# Patient Record
Sex: FEMALE | Race: WHITE | NOT HISPANIC OR LATINO | Employment: FULL TIME | URBAN - METROPOLITAN AREA
[De-identification: names, ages, dates, MRNs, and addresses within clinical notes are randomized per-mention and may not be internally consistent; named-entity substitution may affect disease eponyms.]

---

## 2020-01-23 ENCOUNTER — OFFICE VISIT (OUTPATIENT)
Dept: INTERNAL MEDICINE | Facility: CLINIC | Age: 25
End: 2020-01-23

## 2020-01-23 VITALS
DIASTOLIC BLOOD PRESSURE: 72 MMHG | HEIGHT: 66 IN | OXYGEN SATURATION: 97 % | WEIGHT: 145.3 LBS | SYSTOLIC BLOOD PRESSURE: 116 MMHG | TEMPERATURE: 97.3 F | BODY MASS INDEX: 23.35 KG/M2 | HEART RATE: 86 BPM

## 2020-01-23 DIAGNOSIS — Z20.828 EXPOSURE TO INFLUENZA: Primary | ICD-10-CM

## 2020-01-23 DIAGNOSIS — R68.89 FLU-LIKE SYMPTOMS: ICD-10-CM

## 2020-01-23 DIAGNOSIS — E04.1 THYROID NODULE: ICD-10-CM

## 2020-01-23 PROBLEM — M62.81 MUSCLE WEAKNESS: Status: RESOLVED | Noted: 2019-12-13 | Resolved: 2020-01-23

## 2020-01-23 PROBLEM — M54.50 ACUTE MIDLINE LOW BACK PAIN WITHOUT SCIATICA: Status: RESOLVED | Noted: 2019-12-13 | Resolved: 2020-01-23

## 2020-01-23 PROBLEM — M54.50 ACUTE MIDLINE LOW BACK PAIN WITHOUT SCIATICA: Status: ACTIVE | Noted: 2019-12-13

## 2020-01-23 PROBLEM — M62.81 MUSCLE WEAKNESS: Status: ACTIVE | Noted: 2019-12-13

## 2020-01-23 LAB
EXPIRATION DATE: NORMAL
FLUAV AG NPH QL: NEGATIVE
FLUBV AG NPH QL: NEGATIVE
INTERNAL CONTROL: NORMAL
Lab: NORMAL

## 2020-01-23 PROCEDURE — 87804 INFLUENZA ASSAY W/OPTIC: CPT | Performed by: FAMILY MEDICINE

## 2020-01-23 PROCEDURE — 99204 OFFICE O/P NEW MOD 45 MIN: CPT | Performed by: FAMILY MEDICINE

## 2020-01-23 RX ORDER — OSELTAMIVIR PHOSPHATE 75 MG/1
75 CAPSULE ORAL 2 TIMES DAILY
Qty: 10 CAPSULE | Refills: 0 | Status: SHIPPED | OUTPATIENT
Start: 2020-01-23 | End: 2020-01-28

## 2020-01-23 NOTE — PROGRESS NOTES
Date of Encounter: 2020  Patient: Pari HARVEY,  1995    Subjective   History of Presenting Illness  Chief complaint: Influenza exposure    Patient presents with fever, myalgias, cough after a known influenza A exposure.  Patient was in close contact with a friend for 4 days on a trip and since then that friend has been confirmed influenza A.  Patient has already received influenza vaccine this season.    Recent  after uncomplicated pregnancy.  3-month-old at home.  Breast-feeding.  Feels mood is doing well.    History of thyroid nodule: Thyroid nodule in left side has increased in size since last evaluation approximately 7 years ago.  7 years ago she had an ultrasound, labs, and biopsy which were benign per her report.    Lives with spouse and child.  Sexually active not on contraception.  Never smoker.  No alcohol use.  Exercises twice per week.  Works in [employer].  Up-to-date on hepatitis B, influenza, Td.  Last Pap 2019.    Review of Systems:  Negative for chest pain upon exertion, shortness of breath, vision changes, vomiting, dysuria, lymphadenopathy, muscle weakness, numbness, mood changes, rashes.    Positive for fever, cough, congestion    The following portions of the patient's history were reviewed and updated as appropriate: allergies, current medications, past family history, past medical history, past social history, past surgical history and problem list.    Patient Active Problem List   Diagnosis   • Acute midline low back pain without sciatica   •  (normal spontaneous vaginal delivery)   • Muscle weakness     History reviewed. No pertinent past medical history.  No past surgical history on file.  No family history on file.    Current Outpatient Medications:   •  oseltamivir (TAMIFLU) 75 MG capsule, Take 1 capsule by mouth 2 (Two) Times a Day for 5 days., Disp: 10 capsule, Rfl: 0  •  PRENATAL VIT-FE FUMARATE-FA PO, Take  by mouth Daily., Disp: , Rfl:   No Known  "Allergies  Social History     Tobacco Use   • Smoking status: Never Smoker   • Smokeless tobacco: Never Used   Substance Use Topics   • Alcohol use: Yes     Comment: SC   • Drug use: Not on file          Objective   Physical Exam  Vitals:    01/23/20 1055   BP: 116/72   Pulse: 86   Temp: 97.3 °F (36.3 °C)   SpO2: 97%   Weight: 65.9 kg (145 lb 4.8 oz)   Height: 167.6 cm (66\")     Body mass index is 23.45 kg/m².    Constitutional: NAD.  Eyes: EOMI. PERRLA. Normal conjunctiva.  Ear, nose, mouth, throat: No tonsillar exudates or erythema.   Normal nasal mucosa. Normal external ear canals and TMs bilaterally.  Cardiovascular: RRR. No murmurs. No LE edema b/l. Radial pulses 2+ bilaterally.  Pulmonary: CTA b/l. Good effort.  Integumentary: No lacerations or wounds on face or upper extremities.  Lymphatic: No anterior cervical lymphadenopathy.  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  No associated lymphadenopathy.  Psychiatric: Normal affect. Normal thought content.     Assessment/Plan   Assessment and Plan  Pleasant 24-year-old female with thyroid nodule, recent influenza A exposure    Diagnoses and all orders for this visit:    1. Exposure to influenza (Primary)  -     oseltamivir (TAMIFLU) 75 MG capsule; Take 1 capsule by mouth 2 (Two) Times a Day for 5 days.  Dispense: 10 capsule; Refill: 0    2. Flu-like symptoms  -     oseltamivir (TAMIFLU) 75 MG capsule; Take 1 capsule by mouth 2 (Two) Times a Day for 5 days.  Dispense: 10 capsule; Refill: 0    3. Thyroid nodule  -     Thyroid Panel With TSH  -     US Thyroid  -     Ambulatory Referral to ENT (Otolaryngology)      Go ahead and treat as if influenza even though point-of-care test was negative for influenza.  Discussed risks and benefits of oseltamavir.  Needs evaluation for thyroid nodules increasing size- we will obtain ultrasound, labs, and refer to ENT for further management.    Chaim Harrington MD  Family Medicine  O: [phone]  C: " [phone]    Disclaimer: Parts of this note were dictated by speech recognition. Minor errors in transcription may be present. Please call if questions.

## 2020-01-24 LAB
FT4I SERPL CALC-MCNC: 1.9 (ref 1.2–4.9)
T3RU NFR SERPL: 26 % (ref 24–39)
T4 SERPL-MCNC: 7.3 UG/DL (ref 4.5–12)
TSH SERPL DL<=0.005 MIU/L-ACNC: 1.51 UIU/ML (ref 0.45–4.5)

## 2020-01-24 NOTE — PROGRESS NOTES
Discussed the results over the phone with the patient as previously agreed.    Proceed with imaging and ENT referral

## 2020-02-27 ENCOUNTER — OFFICE VISIT (OUTPATIENT)
Dept: INTERNAL MEDICINE | Facility: CLINIC | Age: 25
End: 2020-02-27

## 2020-02-27 VITALS
SYSTOLIC BLOOD PRESSURE: 100 MMHG | OXYGEN SATURATION: 99 % | BODY MASS INDEX: 23.69 KG/M2 | HEART RATE: 66 BPM | WEIGHT: 147.4 LBS | HEIGHT: 66 IN | DIASTOLIC BLOOD PRESSURE: 64 MMHG | TEMPERATURE: 97.9 F

## 2020-02-27 DIAGNOSIS — B30.9 VIRAL CONJUNCTIVITIS: Primary | ICD-10-CM

## 2020-02-27 PROCEDURE — 99213 OFFICE O/P EST LOW 20 MIN: CPT | Performed by: FAMILY MEDICINE

## 2020-02-27 RX ORDER — POLYMYXIN B SULFATE AND TRIMETHOPRIM 1; 10000 MG/ML; [USP'U]/ML
1 SOLUTION OPHTHALMIC 3 TIMES DAILY PRN
Qty: 10 ML | Refills: 0 | Status: SHIPPED | OUTPATIENT
Start: 2020-02-27 | End: 2020-05-13

## 2020-02-27 NOTE — PROGRESS NOTES
"Date of Encounter: 2020  Patient: Pari HARVEY,  1995    Subjective   History of Presenting Illness  Chief complaint: Conjunctivitis    2-day history of congestion, sore throat, left eye conjunctivitis.  No known sick contacts.  Denies cough, wheezing, fever, chills, myalgia, abdominal pain.  No changes in vision.    Review of Systems:  Negative for fever, cough, shortness of breath, vision changes, vomiting, diarrhea    The following portions of the patient's history were reviewed and updated as appropriate: allergies, current medications, past family history, past medical history, past social history, past surgical history and problem list.    Patient Active Problem List   Diagnosis   • Thyroid nodule     Past Medical History:   Diagnosis Date   • Acute midline low back pain without sciatica 2019   • Muscle weakness 2019   •  (normal spontaneous vaginal delivery) 10/19/2019     Past Surgical History:   Procedure Laterality Date   • DILATATION AND CURETTAGE       History reviewed. No pertinent family history.    Current Outpatient Medications:   •  PRENATAL VIT-FE FUMARATE-FA PO, Take  by mouth Daily., Disp: , Rfl:   No Known Allergies  Social History     Tobacco Use   • Smoking status: Never Smoker   • Smokeless tobacco: Never Used   Substance Use Topics   • Alcohol use: Yes     Comment: SC   • Drug use: Not on file          Objective   Physical Exam  Vitals:    20 0935   BP: 100/64   Pulse: 66   Temp: 97.9 °F (36.6 °C)   SpO2: 99%   Weight: 66.9 kg (147 lb 6.4 oz)   Height: 167.6 cm (66\")     Body mass index is 23.79 kg/m².    Constitutional: NAD.  Eyes: EOMI. PERRLA.  Injected inferior medial left conjunctiva.  Ear, nose, mouth, throat: Erythematous posterior oropharynx with postnasal drip.  No tonsillar exudates..   Normal nasal mucosa. Normal external ear canals and TMs bilaterally.  Cardiovascular: RRR. No murmurs. No LE edema b/l. Radial pulses 2+ bilaterally.  Pulmonary: " CTA b/l. Good effort.  Integumentary: No rashes or wounds on face or upper extremities.  Lymphatic: No anterior cervical lymphadenopathy.  Endocrine: Grossly enlarged left thyroid  Psychiatric: Normal affect. Normal thought content.     Assessment/Plan   Assessment and Plan  Pleasant 84-year-old female with left thyroid nodule, who presents with viral conjunctivitis    Diagnoses and all orders for this visit:    1. Viral conjunctivitis (Primary)  -     trimethoprim-polymyxin b (POLYTRIM) 51858-8.1 UNIT/ML-% ophthalmic solution; Administer 1 drop into the left eye 3 (Three) Times a Day As Needed (conjunctivitis).  Dispense: 10 mL; Refill: 0      Antibiotics for topical use if not improving but otherwise discussed symptomatic management and expected disease course.    4-month-old child at home, discussed symptoms and children and what to do if he contracts this disease.    Strongly encouraged her to follow-up with ENT for thyroid ultrasound and evaluation as she has not done so.    Chaim Harrington MD  Family Medicine  O: [phone]  C: [phone]    Disclaimer: Parts of this note were dictated by speech recognition. Minor errors in transcription may be present. Please call if questions.

## 2020-05-04 ENCOUNTER — TRANSCRIBE ORDERS (OUTPATIENT)
Dept: ADMINISTRATIVE | Facility: HOSPITAL | Age: 25
End: 2020-05-04

## 2020-05-04 DIAGNOSIS — E04.1 THYROID NODULE: Primary | ICD-10-CM

## 2020-05-07 ENCOUNTER — HOSPITAL ENCOUNTER (OUTPATIENT)
Dept: ULTRASOUND IMAGING | Facility: HOSPITAL | Age: 25
End: 2020-05-07

## 2020-05-07 ENCOUNTER — HOSPITAL ENCOUNTER (OUTPATIENT)
Dept: ULTRASOUND IMAGING | Facility: HOSPITAL | Age: 25
Discharge: HOME | End: 2020-05-07
Admitting: OTOLARYNGOLOGY

## 2020-05-07 DIAGNOSIS — E04.1 THYROID NODULE: ICD-10-CM

## 2020-05-07 PROCEDURE — 76536 US EXAM OF HEAD AND NECK: CPT

## 2020-05-11 ENCOUNTER — TRANSCRIBE ORDERS (OUTPATIENT)
Dept: ADMINISTRATIVE | Facility: HOSPITAL | Age: 25
End: 2020-05-11

## 2020-05-11 DIAGNOSIS — E04.1 THYROID NODULE: Primary | ICD-10-CM

## 2020-05-15 ENCOUNTER — HOSPITAL ENCOUNTER (OUTPATIENT)
Dept: ULTRASOUND IMAGING | Facility: HOSPITAL | Age: 25
Discharge: HOME | End: 2020-05-15
Admitting: RADIOLOGY

## 2020-05-15 VITALS
OXYGEN SATURATION: 96 % | TEMPERATURE: 97 F | SYSTOLIC BLOOD PRESSURE: 105 MMHG | DIASTOLIC BLOOD PRESSURE: 71 MMHG | WEIGHT: 140 LBS | HEART RATE: 58 BPM | HEIGHT: 66 IN | BODY MASS INDEX: 22.5 KG/M2 | RESPIRATION RATE: 16 BRPM

## 2020-05-15 DIAGNOSIS — E04.1 THYROID NODULE: ICD-10-CM

## 2020-05-15 LAB
INR PPP: 1 (ref 0.8–1.2)
PROTHROMBIN TIME: 12.1 SECONDS (ref 12.8–15.2)

## 2020-05-15 PROCEDURE — 25010000003 LIDOCAINE 1 % SOLUTION: Performed by: RADIOLOGY

## 2020-05-15 PROCEDURE — 88173 CYTOPATH EVAL FNA REPORT: CPT | Performed by: OTOLARYNGOLOGY

## 2020-05-15 PROCEDURE — 85610 PROTHROMBIN TIME: CPT

## 2020-05-15 PROCEDURE — 88305 TISSUE EXAM BY PATHOLOGIST: CPT | Performed by: OTOLARYNGOLOGY

## 2020-05-15 RX ORDER — LIDOCAINE HYDROCHLORIDE 10 MG/ML
10 INJECTION, SOLUTION INFILTRATION; PERINEURAL ONCE
Status: COMPLETED | OUTPATIENT
Start: 2020-05-15 | End: 2020-05-15

## 2020-05-15 RX ADMIN — LIDOCAINE HYDROCHLORIDE 3 ML: 10 INJECTION, SOLUTION INFILTRATION; PERINEURAL at 13:45

## 2020-05-15 NOTE — NURSING NOTE
Discharge instructions discussed and understood by patient. No dysphagia.  Drsg to site dry and intact. No distress.

## 2020-05-15 NOTE — H&P
Name: Pari HARVEY ADMIT: 5/15/2020   : 1995  PCP: Chaim Harrington MD    MRN: [id number] LOS: 0 days   AGE/SEX: 24 y.o. female  ROOM: Room/bed info not found       Chief complaint   Patient is a 24 y.o. female presents for thyroid biopsyPast Surgical History:  Past Surgical History:   Procedure Laterality Date   • DILATATION AND CURETTAGE         Past Medical History:  Past Medical History:   Diagnosis Date   • Acute midline low back pain without sciatica 2019   • Muscle weakness 2019   •  (normal spontaneous vaginal delivery) 10/19/2019       Home Medications:    (Not in a hospital admission)    Allergies:  Patient has no known allergies.    Family History:  No family history on file.    Social History:  Social History     Tobacco Use   • Smoking status: Never Smoker   • Smokeless tobacco: Never Used   Substance Use Topics   • Alcohol use: Yes     Comment: SC   • Drug use: Not on file        Objective     Physical Exam:   Thyroid nodule    Vital Signs  Temp:  [97 °F (36.1 °C)] 97 °F (36.1 °C)  Heart Rate:  [66] 66  Resp:  [16] 16  BP: (103)/(69) 103/69    Anticipated Surgical Procedure:  Thyroid biopsy    The risks, benefits and alternatives of this procedure have been discussed with the patient or responsible party: Yes        Javad Kam MD  05/15/20  12:52

## 2020-05-15 NOTE — DISCHARGE INSTRUCTIONS
EDUCATION / DISCHARGE INSTRUCTIONS  Aspiration:  An aspiration is a procedure used to take a sample of cells or tissue from a lump, mass or other area of concern.   Within a week the radiologist will send a report to your physician.  A pathologist will also examine the tissue and send a report.  THIS EDUCATION INFORMATION WAS REVIEWED PRIOR TO THE PROCEDURE AND CONSENT.  Patient initials_________________Time________________      Post Procedure:    *  Rest today (no pushing pulling or straining).   *  Slowly increase activity tomorow.    *  If you received sedation do not drive for 24 hours.   *  Keep dressing clean and dry.   *  Leave dressing on puncture site for 24 hours.    *  You may shower when dressing removed.   *  If needed, use an ice pack at the site for up to 20 minutes at a time for pain.    Call your doctor if experiencing:   *  Signs of infection such as redness, swelling, excessive pain and / or foul      smelling drainage from the puncture site.   *  Chills or fever over 101 degrees (by mouth).   *  Unrelieved pain.   *  Any new or severe symptoms.      Following the procedure:     Follow-up with the ordering physician as directed.    Continue to take other medications as directed by your physician unless  otherwise instructed.   If applicable, resume taking your blood thinners or Aspirin on May 16th 2020 after 2pm     If you have any concerns please call the Radiology Nurses Desk at [phone].  You are the most important factor in your recovery.  Follow the above instructions carefully.

## 2020-05-18 LAB
CYTO UR: NORMAL
LAB AP CASE REPORT: NORMAL
LAB AP DIAGNOSIS COMMENT: NORMAL
LAB AP NON-GYN SPECIMEN ADEQUACY: NORMAL
PATH REPORT.FINAL DX SPEC: NORMAL
PATH REPORT.GROSS SPEC: NORMAL

## 2020-06-01 ENCOUNTER — OFFICE VISIT (OUTPATIENT)
Dept: INTERNAL MEDICINE | Facility: CLINIC | Age: 25
End: 2020-06-01

## 2020-06-01 VITALS
SYSTOLIC BLOOD PRESSURE: 102 MMHG | DIASTOLIC BLOOD PRESSURE: 58 MMHG | OXYGEN SATURATION: 98 % | BODY MASS INDEX: 21.24 KG/M2 | WEIGHT: 131.6 LBS | HEART RATE: 72 BPM

## 2020-06-01 DIAGNOSIS — E86.0 DEHYDRATION: ICD-10-CM

## 2020-06-01 DIAGNOSIS — R11.2 NON-INTRACTABLE VOMITING WITH NAUSEA, UNSPECIFIED VOMITING TYPE: Primary | ICD-10-CM

## 2020-06-01 LAB
B-HCG UR QL: NEGATIVE
INTERNAL NEGATIVE CONTROL: NEGATIVE
INTERNAL POSITIVE CONTROL: POSITIVE
Lab: NORMAL

## 2020-06-01 PROCEDURE — 81025 URINE PREGNANCY TEST: CPT | Performed by: FAMILY MEDICINE

## 2020-06-01 PROCEDURE — 99214 OFFICE O/P EST MOD 30 MIN: CPT | Performed by: FAMILY MEDICINE

## 2020-06-01 PROCEDURE — 96372 THER/PROPH/DIAG INJ SC/IM: CPT | Performed by: FAMILY MEDICINE

## 2020-06-01 RX ORDER — PROMETHAZINE HYDROCHLORIDE 12.5 MG/1
12.5 TABLET ORAL EVERY 6 HOURS PRN
Qty: 15 TABLET | Refills: 0 | Status: SHIPPED | OUTPATIENT
Start: 2020-06-01 | End: 2020-06-04

## 2020-06-01 RX ORDER — PROMETHAZINE HYDROCHLORIDE 25 MG/ML
12.5 INJECTION, SOLUTION INTRAMUSCULAR; INTRAVENOUS ONCE
Status: COMPLETED | OUTPATIENT
Start: 2020-06-01 | End: 2020-06-01

## 2020-06-01 RX ADMIN — PROMETHAZINE HYDROCHLORIDE 12.5 MG: 25 INJECTION, SOLUTION INTRAMUSCULAR; INTRAVENOUS at 14:32

## 2020-06-01 NOTE — PROGRESS NOTES
Date of Encounter: 2020  Patient: Pari HARVEY,  1995    Subjective   History of Presenting Illness  Chief complaint: Vomiting    1 day of nausea and bilious emesis with inability to tolerate p.o.  Now feeling worsening nausea, lightheadedness.  No tachycardia or syncope.  Denies cough, shortness of breath, myalgias, fever, chills, abdominal pain, diarrhea, constipation, dysuria.  Using breast-feeding for contraception, 7-month-old child at home, point-of-care pregnancy test negative.    Review of Systems:  Negative for fever, congestion, chest pain upon exertion, shortness of breath, vision changes, dysuria, lymphadenopathy, muscle weakness, numbness, mood changes, rashes.    The following portions of the patient's history were reviewed and updated as appropriate: allergies, current medications, past family history, past medical history, past social history, past surgical history and problem list.    Patient Active Problem List   Diagnosis   • Thyroid nodule     Past Medical History:   Diagnosis Date   • Acute midline low back pain without sciatica 2019   • Muscle weakness 2019   •  (normal spontaneous vaginal delivery) 10/19/2019     Past Surgical History:   Procedure Laterality Date   • DILATATION AND CURETTAGE     • US GUIDED FINE NEEDLE ASPIRATION  5/15/2020     History reviewed. No pertinent family history.    Current Outpatient Medications:   •  PRENATAL VIT-FE FUMARATE-FA PO, Take  by mouth Daily., Disp: , Rfl:   No Known Allergies  Social History     Tobacco Use   • Smoking status: Never Smoker   • Smokeless tobacco: Never Used   Substance Use Topics   • Alcohol use: Yes     Comment: SC   • Drug use: Not on file          Objective   Physical Exam  Vitals:    20 1232   BP: 102/58   Pulse: 72   SpO2: 98%   Weight: 59.7 kg (131 lb 9.6 oz)     Body mass index is 21.24 kg/m².    Constitutional: NAD.  Eyes: EOMI. PERRLA. Normal conjunctiva.  Ear, nose, mouth, throat: No tonsillar  exudates or erythema.   Normal nasal mucosa. Normal external ear canals and TMs bilaterally.  Cardiovascular: RRR. No murmurs. No LE edema b/l. Radial pulses 1+ bilaterally.  Reduced capillary refill in fingers.  Pulmonary: CTA b/l. Good effort.  Integumentary: No rashes or wounds on face or upper extremities.  Gastrointestinal: Nondistended. No hepatosplenomegaly. No focal tenderness to palpation.  Negative rebound tenderness.  Negative Love sign.  Normal bowel sounds.  Able to jump up and down without abdominal pain.  Lymphatic: No anterior cervical lymphadenopathy.  Endocrine: No thyromegaly or palpable thyroid nodules.  Psychiatric: Normal affect. Normal thought content.     Assessment/Plan   Assessment and Plan  Pleasant 24-year-old female with history of thyroid nodule who presents with the following:    Diagnoses and all orders for this visit:    1. Non-intractable vomiting with nausea, unspecified vomiting type (Primary)  -     COVID-19,[facility] ROUTINE, NP/OP SWAB IN TRANSPORT MEDIA OR ESWAB 72 HR TAT - Swab, Nasopharynx; Future  -     promethazine (PHENERGAN) 12.5 MG tablet; Take 1 tablet by mouth Every 6 (Six) Hours As Needed for Nausea or Vomiting.  Dispense: 15 tablet; Refill: 0  -     promethazine (PHENERGAN) injection 12.5 mg    2. Dehydration      Point-of-care pregnancy test negative.    Will manage conservatively as she has no evidence of appendicitis or cholecystitis on exam.  Discussed vigorous oral rehydration, as needed promethazine.  Patient instructed to pump and dump for 24 hours after taking medication.  Discussed reasons to go to the ER including appendicitis symptoms.  We will also get her screen for COVID in context of pandemic.    Chaim Harrington MD  Family Medicine  O: [phone]  C: [phone]    Disclaimer: Parts of this note were dictated by speech recognition. Minor errors in transcription may be present. Please call if questions.

## 2020-06-04 ENCOUNTER — OFFICE VISIT (OUTPATIENT)
Dept: INTERNAL MEDICINE | Facility: CLINIC | Age: 25
End: 2020-06-04

## 2020-06-04 VITALS
HEART RATE: 68 BPM | SYSTOLIC BLOOD PRESSURE: 100 MMHG | WEIGHT: 134.4 LBS | DIASTOLIC BLOOD PRESSURE: 58 MMHG | OXYGEN SATURATION: 97 % | HEIGHT: 66 IN | BODY MASS INDEX: 21.6 KG/M2

## 2020-06-04 DIAGNOSIS — Z00.00 ANNUAL PHYSICAL EXAM: Primary | ICD-10-CM

## 2020-06-04 DIAGNOSIS — Z30.09 FAMILY PLANNING ADVICE: ICD-10-CM

## 2020-06-04 DIAGNOSIS — E04.1 THYROID NODULE: ICD-10-CM

## 2020-06-04 PROCEDURE — 99395 PREV VISIT EST AGE 18-39: CPT | Performed by: FAMILY MEDICINE

## 2020-06-04 RX ORDER — PNV NO.95/FERROUS FUM/FOLIC AC 28MG-0.8MG
1 TABLET ORAL DAILY
Qty: 30 TABLET
Start: 2020-06-04 | End: 2022-10-27

## 2020-06-04 NOTE — PROGRESS NOTES
Date of Encounter: 2020  Patient: Pari HARVEY,  1995    Subjective   History of Presenting Illness  Chief complaint: Annual physical    Nausea/vomiting has resolved, symptoms minimal at this time.    No acute healthcare concerns.    Thyroid nodule: Planning on elective removal later this year.    7-month-old child, still breast-feeding, plan to wean over the next few months that she has an adequate supply.  Using friends and family for childcare, plans for  soon.  Has not had a menstrual period yet.  Not interested in contraception as they are planning on a second child.  Not currently taking prenatal vitamin.  Last Pap smear in March.    Lives at home with child and spouse.  Never smoker, 2 alcoholic drinks per month.  Works for [employer].  Not currently exercising but would like to.  Up-to-date on vaccinations.    Review of Systems:  Negative for fever, congestion, chest pain upon exertion, shortness of breath, vision changes, vomiting, dysuria, lymphadenopathy, muscle weakness, numbness, mood changes, rashes.    The following portions of the patient's history were reviewed and updated as appropriate: allergies, current medications, past family history, past medical history, past social history, past surgical history and problem list.    Patient Active Problem List   Diagnosis   • Thyroid nodule     Past Medical History:   Diagnosis Date   • Acute midline low back pain without sciatica 2019   • Muscle weakness 2019   •  (normal spontaneous vaginal delivery) 10/19/2019     Past Surgical History:   Procedure Laterality Date   • DILATATION AND CURETTAGE     • US GUIDED FINE NEEDLE ASPIRATION  5/15/2020     History reviewed. No pertinent family history.    Current Outpatient Medications:   •  promethazine (PHENERGAN) 12.5 MG tablet, Take 1 tablet by mouth Every 6 (Six) Hours As Needed for Nausea or Vomiting., Disp: 15 tablet, Rfl: 0  No Known Allergies  Social History  "    Tobacco Use   • Smoking status: Never Smoker   • Smokeless tobacco: Never Used   Substance Use Topics   • Alcohol use: Yes     Comment: SC   • Drug use: Not on file          Objective   Physical Exam  Vitals:    06/04/20 1243   BP: 100/58   Pulse: 68   SpO2: 97%   Weight: 61 kg (134 lb 6.4 oz)   Height: 167.6 cm (66\")     Body mass index is 21.69 kg/m².    Constitutional: NAD.  Eyes: EOMI. PERRLA. Normal conjunctiva.  Ear, nose, mouth, throat: No tonsillar exudates or erythema.  Mild postnasal drip.  Normal nasal mucosa. Normal external ear canals and TMs bilaterally.  Cardiovascular: RRR. No murmurs. No LE edema b/l. Radial pulses 2+ bilaterally.  Pulmonary: CTA b/l. Good effort.  Integumentary: No rashes or wounds on face or upper extremities.  Lymphatic: No anterior cervical lymphadenopathy.  Endocrine: Large left-sided thyroid nodule.  Psychiatric: Normal affect. Normal thought content.     Assessment/Plan   Assessment and Plan  Very pleasant 24-year-old female with thyroid nodule, who presents for the following:    Diagnoses and all orders for this visit:    1. Annual physical exam (Primary): We discussed preventative care including age and patient-appropriate immunizations and cancer screening. We also discussed the importance of exercise and a healthy diet. This is their annual preventative exam.  Encouraged regular exercise.    2. Thyroid nodule: Follow-up with ENT as scheduled    3. Family planning advice: Discussed family planning including child intervals, prenatal vitamins, contraception, breast-feeding weaning.    Other orders  -     Prenatal Vit-Fe Fumarate-FA (PRENATAL VITAMIN) 27-0.8 MG tablet; Take 1 tablet by mouth Daily.  Dispense: 30 tablet    Return to office in 1 year for annual physical or earlier as needed.    Chaim Harrington MD  Family Medicine  O: [phone]  C: [phone]    Disclaimer: Parts of this note were dictated by speech recognition. Minor errors in transcription may be " present. Please call if questions.

## 2020-12-14 ENCOUNTER — TELEPHONE (OUTPATIENT)
Dept: INTERNAL MEDICINE | Facility: CLINIC | Age: 25
End: 2020-12-14

## 2020-12-21 ENCOUNTER — FLU SHOT (OUTPATIENT)
Dept: INTERNAL MEDICINE | Facility: CLINIC | Age: 25
End: 2020-12-21

## 2020-12-21 DIAGNOSIS — Z11.1 TUBERCULOSIS SCREENING: Primary | ICD-10-CM

## 2020-12-21 DIAGNOSIS — Z23 NEED FOR INFLUENZA VACCINATION: ICD-10-CM

## 2020-12-21 PROCEDURE — 90471 IMMUNIZATION ADMIN: CPT | Performed by: FAMILY MEDICINE

## 2020-12-21 PROCEDURE — 90686 IIV4 VACC NO PRSV 0.5 ML IM: CPT | Performed by: FAMILY MEDICINE

## 2020-12-23 LAB
GAMMA INTERFERON BACKGROUND BLD IA-ACNC: 0.03 IU/ML
M TB IFN-G BLD-IMP: NEGATIVE
M TB IFN-G CD4+ BCKGRND COR BLD-ACNC: 0.03 IU/ML
M TB IFN-G CD4+CD8+ BCKGRND COR BLD-ACNC: 0.02 IU/ML
MITOGEN IGNF BLD-ACNC: >10 IU/ML
QUANTIFERON INCUBATION: NORMAL
SERVICE CMNT-IMP: NORMAL

## 2020-12-24 ENCOUNTER — TELEPHONE (OUTPATIENT)
Dept: INTERNAL MEDICINE | Facility: CLINIC | Age: 25
End: 2020-12-24

## 2020-12-24 NOTE — TELEPHONE ENCOUNTER
----- Message from Chaim Harrington MD sent at 12/24/2020  9:04 AM EST -----  Please call the patient about their results with the following discussion points:    Tuberculosis screening negative

## 2021-04-16 ENCOUNTER — BULK ORDERING (OUTPATIENT)
Dept: CASE MANAGEMENT | Facility: OTHER | Age: 26
End: 2021-04-16

## 2021-04-16 DIAGNOSIS — Z23 IMMUNIZATION DUE: ICD-10-CM

## 2021-06-07 ENCOUNTER — OFFICE VISIT (OUTPATIENT)
Dept: INTERNAL MEDICINE | Facility: CLINIC | Age: 26
End: 2021-06-07

## 2021-06-07 VITALS
DIASTOLIC BLOOD PRESSURE: 58 MMHG | HEIGHT: 66 IN | SYSTOLIC BLOOD PRESSURE: 90 MMHG | HEART RATE: 94 BPM | WEIGHT: 153 LBS | OXYGEN SATURATION: 99 % | BODY MASS INDEX: 24.59 KG/M2

## 2021-06-07 DIAGNOSIS — Z00.00 ANNUAL PHYSICAL EXAM: Primary | ICD-10-CM

## 2021-06-07 DIAGNOSIS — Z3A.27 27 WEEKS GESTATION OF PREGNANCY: ICD-10-CM

## 2021-06-07 DIAGNOSIS — E04.1 THYROID NODULE: ICD-10-CM

## 2021-06-07 PROCEDURE — 99395 PREV VISIT EST AGE 18-39: CPT | Performed by: FAMILY MEDICINE

## 2021-06-07 RX ORDER — ACETAMINOPHEN 500 MG
TABLET ORAL
COMMUNITY

## 2021-06-07 NOTE — PROGRESS NOTES
Date of Encounter: 2021  Patient: Pari HARVEY,  1995    Subjective   History of Presenting Illness  Chief complaint: Annual physical    No acute concerns.    Thyroid nodule with benign biopsy, did not have it removed electively because of Covid pandemic and pregnancy.  Plans to have her removed after her delivery.    27 weeks pregnant, no known pregnancy complications, gaining appropriate weight, good fetal movement, no abnormal vaginal discharge.  Plans to breast-feed.  Not interested in postpartum contraception as likely considering a third child.  She will be taking 6 weeks of maternity leave.    Lives at home with child 18 months old and spouse.  Never smoker, not currently drinking alcohol.  Exercising by walking twice weekly.  Well-balanced diet.  Works for [employer].  Up-to-date on vaccinations with the exception of COVID-19 which she is unsure of.    Review of Systems:  Negative for fever, congestion, chest pain upon exertion, shortness of breath, vision changes, vomiting, dysuria, lymphadenopathy, muscle weakness, numbness, mood changes, rashes.    The following portions of the patient's history were reviewed and updated as appropriate: allergies, current medications, past family history, past medical history, past social history, past surgical history and problem list.    Patient Active Problem List   Diagnosis   • Thyroid nodule     Past Medical History:   Diagnosis Date   • Acute midline low back pain without sciatica 2019   • Muscle weakness 2019   •  (normal spontaneous vaginal delivery) 10/19/2019     Past Surgical History:   Procedure Laterality Date   • DILATATION AND CURETTAGE     • US GUIDED FINE NEEDLE ASPIRATION  5/15/2020     Family History   Problem Relation Age of Onset   • Heart disease Mother        Current Outpatient Medications:   •  acetaminophen (TYLENOL) 500 MG tablet, Take  by mouth., Disp: , Rfl:   •  Prenatal Vit-Fe Fumarate-FA (PRENATAL  "VITAMIN) 27-0.8 MG tablet, Take 1 tablet by mouth Daily., Disp: 30 tablet, Rfl:   No Known Allergies  Social History     Tobacco Use   • Smoking status: Never Smoker   • Smokeless tobacco: Never Used   Substance Use Topics   • Alcohol use: Yes     Comment: SC   • Drug use: Not on file          Objective   Physical Exam  Vitals:    06/07/21 1245   BP: 90/58   Pulse: 94   SpO2: 99%   Weight: 69.4 kg (153 lb)   Height: 167.6 cm (66\")     Body mass index is 24.69 kg/m².    Constitutional: NAD.  Eyes: EOMI. PERRLA. Normal conjunctiva.  Ear, nose, mouth, throat: Normal external ear canals and TMs bilaterally.  Cardiovascular: RRR. No murmurs. No LE edema b/l. Radial pulses 2+ bilaterally.  Pulmonary: CTA b/l. Good effort.  Integumentary: No rashes or wounds on face or upper extremities.  Lymphatic: No anterior cervical lymphadenopathy.  Endocrine: No thyromegaly or palpable thyroid nodules.  Psychiatric: Normal affect. Normal thought content.  Uterine: Fetal heart rate 130 by Doppler.  Fundal height concordant with gestational age. Fetus low and transverse.     Assessment/Plan   Assessment and Plan  Pleasant 25-year-old female with benign thyroid nodule who presents with the following:    Diagnoses and all orders for this visit:    1. Annual physical exam (Primary): We discussed preventative care including age and patient-appropriate immunizations and cancer screening. We also discussed the importance of exercise and a healthy diet. This is their annual preventative exam.  I did  her on the risks and benefits of COVID-19 vaccination and encouraged her to continue wearing a facemask since pregnant and not immunized.    2. Thyroid nodule: Encouraged removal based on size despite benign biopsy after her delivery    3. 27 weeks gestation of pregnancy: Continue to follow with OB.  Normal fetal heart tones and fundal height today    Return to office in 1 year for annual physical or earlier as needed.    Chaim Harrington, " MD  Family Medicine  O: [phone]  C: [phone]    Disclaimer: Parts of this note were dictated by speech recognition. Minor errors in transcription may be present. Please call if questions.

## 2021-09-30 ENCOUNTER — TELEPHONE (OUTPATIENT)
Dept: INTERNAL MEDICINE | Facility: CLINIC | Age: 26
End: 2021-09-30

## 2021-09-30 DIAGNOSIS — E04.1 THYROID NODULE: Primary | ICD-10-CM

## 2021-10-22 ENCOUNTER — TRANSCRIBE ORDERS (OUTPATIENT)
Dept: ADMINISTRATIVE | Facility: HOSPITAL | Age: 26
End: 2021-10-22

## 2021-10-22 DIAGNOSIS — E04.1 LEFT THYROID NODULE: Primary | ICD-10-CM

## 2021-11-12 ENCOUNTER — HOSPITAL ENCOUNTER (OUTPATIENT)
Dept: ULTRASOUND IMAGING | Facility: HOSPITAL | Age: 26
Discharge: HOME | End: 2021-11-12
Admitting: OTOLARYNGOLOGY

## 2021-11-12 DIAGNOSIS — E04.1 LEFT THYROID NODULE: ICD-10-CM

## 2021-11-12 PROCEDURE — 76536 US EXAM OF HEAD AND NECK: CPT

## 2021-11-15 ENCOUNTER — OFFICE VISIT (OUTPATIENT)
Dept: INTERNAL MEDICINE | Facility: CLINIC | Age: 26
End: 2021-11-15

## 2021-11-15 VITALS
HEIGHT: 66 IN | HEART RATE: 79 BPM | WEIGHT: 152.6 LBS | SYSTOLIC BLOOD PRESSURE: 118 MMHG | BODY MASS INDEX: 24.53 KG/M2 | TEMPERATURE: 97.8 F | OXYGEN SATURATION: 98 % | DIASTOLIC BLOOD PRESSURE: 62 MMHG

## 2021-11-15 DIAGNOSIS — E04.1 THYROID NODULE: ICD-10-CM

## 2021-11-15 DIAGNOSIS — B37.89 YEAST INFECTION OF NIPPLE, POSTPARTUM: Primary | ICD-10-CM

## 2021-11-15 PROCEDURE — 99213 OFFICE O/P EST LOW 20 MIN: CPT | Performed by: NURSE PRACTITIONER

## 2021-11-15 RX ORDER — FLUCONAZOLE 200 MG/1
TABLET ORAL
Qty: 12 TABLET | Refills: 0 | Status: SHIPPED | OUTPATIENT
Start: 2021-11-15 | End: 2022-10-27

## 2021-11-15 NOTE — PROGRESS NOTES
Date of Encounter: 11/15/2021  Patient: Pari Mcgrath,  1995    Subjective     Chief complaint: Rash  HPI   Patient states she has a history of yeast infections after pregnancy.  Patient is breast-feeding currently.  Baby has been treated for thrush.  Patient states she has rash on nipples and irritation in mouth.  Has been using nystatin cream from previous pregnancy.  Also states she recently had thyroid ultrasound.  Plans to follow-up with Dr. Rainey, ENT.  No other complaints    Review of Systems:  Negative for fever, congestion, chest pain upon exertion, shortness of breath, vision changes, vomiting, dysuria, lymphadenopathy, muscle weakness, numbness, mood changes.    The following portions of the patient's history were reviewed and updated as appropriate: allergies, current medications, past family history, past medical history, past social history, past surgical history and problem list.    Patient Active Problem List   Diagnosis   • Thyroid nodule     Past Medical History:   Diagnosis Date   • Acute midline low back pain without sciatica 2019   • Muscle weakness 2019   •  (normal spontaneous vaginal delivery) 10/19/2019     Past Surgical History:   Procedure Laterality Date   • DILATATION AND CURETTAGE     • US GUIDED FINE NEEDLE ASPIRATION  5/15/2020     Family History   Problem Relation Age of Onset   • Heart disease Mother        Current Outpatient Medications:   •  acetaminophen (TYLENOL) 500 MG tablet, Take  by mouth., Disp: , Rfl:   •  fluconazole (Diflucan) 200 MG tablet, Take 2 tabs on day #1, then 1 tab orally daily for 10 days, Disp: 12 tablet, Rfl: 0  •  Prenatal Vit-Fe Fumarate-FA (PRENATAL VITAMIN) 27-0.8 MG tablet, Take 1 tablet by mouth Daily., Disp: 30 tablet, Rfl:   No Known Allergies  Social History     Tobacco Use   • Smoking status: Never Smoker   • Smokeless tobacco: Never Used   Substance Use Topics   • Alcohol use: Yes     Comment: SC   • Drug use: Not on file  "         Objective   Physical Exam   Vitals:    11/15/21 1500   BP: 118/62   Pulse: 79   Temp: 97.8 °F (36.6 °C)   TempSrc: Temporal   SpO2: 98%   Weight: 69.2 kg (152 lb 9.6 oz)   Height: 167.6 cm (66\")     Body mass index is 24.63 kg/m².    Constitutional: NAD.    EENT: No tonsillar exudates or erythema.    Cardiovascular: RRR. No murmurs.   Pulmonary: CTA b/l. Good effort.  Integumentary: Positive for slightly raised, red rash around right nipple  Endocrine: pos palpable thyroid nodule left side.  Psychiatric: Normal affect. Normal thought content.           Assessment/Plan   Assessment and Plan  Pleasant 26-year-old postpartum female with history of thyroid nodule here today to discuss rash.    Diagnoses and all orders for this visit:    1. Yeast infection of nipple, postpartum (Primary)  -     fluconazole (Diflucan) 200 MG tablet; Take 2 tabs on day #1, then 1 tab orally daily for 10 days  Dispense: 12 tablet; Refill: 0  Discussed about medications.  Patient states she is going to start OTC probiotic.  Infant to continue to be treated for thrush.  Discussed about keeping skin clean and dry.  Also discussed about warm compress to prevent clogged ducts.    2. Thyroid nodule  Recent ultrasound reviewed.  Patient to continue to follow with ENT Dr. Rainey.         Patient verbalized understanding of and agreed to plan of care.  Return if symptoms worsen or fail to improve.     Danette Klein DNP, FNP-C  [facility]  O: [phone]      Please note that portions of this note were completed with a voice recognition program. Please call if questions.     "

## 2021-11-30 ENCOUNTER — CLINICAL SUPPORT (OUTPATIENT)
Dept: INTERNAL MEDICINE | Facility: CLINIC | Age: 26
End: 2021-11-30

## 2021-11-30 DIAGNOSIS — Z11.1 SCREENING FOR TUBERCULOSIS: Primary | ICD-10-CM

## 2021-11-30 PROCEDURE — 36415 COLL VENOUS BLD VENIPUNCTURE: CPT | Performed by: FAMILY MEDICINE

## 2021-11-30 NOTE — PROGRESS NOTES
Venipuncture performed in [facility] by KD with good hemostasis. Patient tolerated well. 11/30/2021 [place] SHIELDS LPN.

## 2021-12-02 LAB
GAMMA INTERFERON BACKGROUND BLD IA-ACNC: 0.03 IU/ML
M TB IFN-G BLD-IMP: NEGATIVE
M TB IFN-G CD4+ BCKGRND COR BLD-ACNC: 0.03 IU/ML
M TB IFN-G CD4+CD8+ BCKGRND COR BLD-ACNC: 0.04 IU/ML
MITOGEN IGNF BLD-ACNC: >10 IU/ML
QUANTIFERON INCUBATION: NORMAL
SERVICE CMNT-IMP: NORMAL

## 2022-07-27 ENCOUNTER — OFFICE VISIT (OUTPATIENT)
Dept: INTERNAL MEDICINE | Facility: CLINIC | Age: 27
End: 2022-07-27

## 2022-07-27 VITALS
OXYGEN SATURATION: 99 % | TEMPERATURE: 96.9 F | WEIGHT: 136.4 LBS | HEART RATE: 86 BPM | SYSTOLIC BLOOD PRESSURE: 114 MMHG | HEIGHT: 66 IN | DIASTOLIC BLOOD PRESSURE: 67 MMHG | BODY MASS INDEX: 21.92 KG/M2

## 2022-07-27 DIAGNOSIS — R09.81 NASAL CONGESTION: ICD-10-CM

## 2022-07-27 DIAGNOSIS — J01.90 ACUTE NON-RECURRENT SINUSITIS, UNSPECIFIED LOCATION: ICD-10-CM

## 2022-07-27 DIAGNOSIS — R05.9 COUGH: ICD-10-CM

## 2022-07-27 DIAGNOSIS — J02.9 SORE THROAT: Primary | ICD-10-CM

## 2022-07-27 LAB
EXPIRATION DATE: NORMAL
EXPIRATION DATE: NORMAL
FLUAV AG UPPER RESP QL IA.RAPID: NOT DETECTED
FLUBV AG UPPER RESP QL IA.RAPID: NOT DETECTED
INTERNAL CONTROL: NORMAL
INTERNAL CONTROL: NORMAL
Lab: NORMAL
Lab: NORMAL
S PYO AG THROAT QL: NEGATIVE
SARS-COV-2 AG UPPER RESP QL IA.RAPID: NOT DETECTED

## 2022-07-27 PROCEDURE — 87428 SARSCOV & INF VIR A&B AG IA: CPT | Performed by: NURSE PRACTITIONER

## 2022-07-27 PROCEDURE — 99213 OFFICE O/P EST LOW 20 MIN: CPT | Performed by: NURSE PRACTITIONER

## 2022-07-27 PROCEDURE — 87880 STREP A ASSAY W/OPTIC: CPT | Performed by: NURSE PRACTITIONER

## 2022-07-27 RX ORDER — FLUTICASONE PROPIONATE 50 MCG
2 SPRAY, SUSPENSION (ML) NASAL DAILY
Qty: 11.1 ML | Refills: 3 | Status: SHIPPED | OUTPATIENT
Start: 2022-07-27

## 2022-07-27 RX ORDER — AZITHROMYCIN 250 MG/1
TABLET, FILM COATED ORAL
Qty: 6 TABLET | Refills: 0 | Status: SHIPPED | OUTPATIENT
Start: 2022-07-27 | End: 2022-10-27

## 2022-07-27 RX ORDER — PSEUDOEPHEDRINE HYDROCHLORIDE 60 MG/1
60 TABLET, FILM COATED ORAL EVERY 4 HOURS PRN
Qty: 30 TABLET | Refills: 0 | Status: SHIPPED | OUTPATIENT
Start: 2022-07-27 | End: 2022-10-27

## 2022-07-27 NOTE — PROGRESS NOTES
Date of Encounter: 2022  Patient: Pari Mcgrath,  1995    Subjective     Chief complaint: Congestion and sore throat    HPI   Patient here today for same-day sick visit.  Patient complains of sinus pressure and a sore throat since  night.  Patient states the sore throat has improved but she has a lot of sinus pressure.  Patient states she has some cough.  And increased mucus.  Patient denies any fever, chills or body aches.  Patient has not taken anything over-the-counter for her symptoms.      Review of Systems:  Negative for fever, congestion, chest pain upon exertion, shortness of breath, vision changes, vomiting, dysuria, lymphadenopathy, muscle weakness, numbness, mood changes, rashes.    The following portions of the patient's history were reviewed and updated as appropriate: allergies, current medications, past family history, past medical history, past social history, past surgical history and problem list.    Patient Active Problem List   Diagnosis   • Thyroid nodule     Past Medical History:   Diagnosis Date   • Acute midline low back pain without sciatica 2019   • Muscle weakness 2019   •  (normal spontaneous vaginal delivery) 10/19/2019     Past Surgical History:   Procedure Laterality Date   • DILATATION AND CURETTAGE     • US GUIDED FINE NEEDLE ASPIRATION  5/15/2020     Family History   Problem Relation Age of Onset   • Heart disease Mother        Current Outpatient Medications:   •  acetaminophen (TYLENOL) 500 MG tablet, Take  by mouth., Disp: , Rfl:   •  Prenatal Vit-Fe Fumarate-FA (PRENATAL VITAMIN) 27-0.8 MG tablet, Take 1 tablet by mouth Daily., Disp: 30 tablet, Rfl:   •  azithromycin (Zithromax Z-[place]) 250 MG tablet, Take 2 tablets by mouth on day 1, then 1 tablet daily on days 2-5, Disp: 6 tablet, Rfl: 0  •  fluconazole (Diflucan) 200 MG tablet, Take 2 tabs on day #1, then 1 tab orally daily for 10 days, Disp: 12 tablet, Rfl: 0  •  fluticasone (Flonase) 50 MCG/ACT  "nasal spray, 2 sprays into the nostril(s) as directed by provider Daily., Disp: 11.1 mL, Rfl: 3  •  pseudoephedrine (SUDAFED) 60 MG tablet, Take 1 tablet by mouth Every 4 (Four) Hours As Needed for Congestion., Disp: 30 tablet, Rfl: 0  No Known Allergies  Social History     Tobacco Use   • Smoking status: Never Smoker   • Smokeless tobacco: Never Used   Substance Use Topics   • Alcohol use: Yes     Comment: SC          Objective   Physical Exam   Vitals:    07/27/22 1018   BP: 114/67   Pulse: 86   Temp: 96.9 °F (36.1 °C)   TempSrc: Temporal   SpO2: 99%   Weight: 61.9 kg (136 lb 6.4 oz)   Height: 167.6 cm (66\")     Body mass index is 22.02 kg/m².    Constitutional: NAD.  Ear, nose, mouth, throat: No tonsillar exudates positive erythema.   Normal nasal mucosa. Normal external ear canals and TMs bilaterally.  Pain with palpation over frontal sinus cavities.  Cardiovascular: RRR. Pulmonary: CTA b/l. Good effort.  Integumentary: No rashes or wounds on face or upper extremities.  Lymphatic: No anterior cervical lymphadenopathy.           Assessment & Plan   Assessment and Plan  Pleasant 26-year-old female here today for sick visit.  The following was discussed:    Diagnoses and all orders for this visit:    1. Sore throat (Primary)  -     POCT rapid strep A    2. Nasal congestion  -     POCT SARS-CoV-2 Antigen BLAKE    3. Cough  -     POCT SARS-CoV-2 Antigen BLAKE    4. Acute non-recurrent sinusitis, unspecified location  -     pseudoephedrine (SUDAFED) 60 MG tablet; Take 1 tablet by mouth Every 4 (Four) Hours As Needed for Congestion.  Dispense: 30 tablet; Refill: 0  -     fluticasone (Flonase) 50 MCG/ACT nasal spray; 2 sprays into the nostril(s) as directed by provider Daily.  Dispense: 11.1 mL; Refill: 3  -     azithromycin (Zithromax Z-[place]) 250 MG tablet; Take 2 tablets by mouth on day 1, then 1 tablet daily on days 2-5  Dispense: 6 tablet; Refill: 0         Encourage patient to wait 7 to 10 days before starting any " antibiotics.  Discussed with patient side effects of medications.  Discussed about medications and their relation to breast-feeding.  Answered some questions from the patient.  Encourage patient to increase her fluids and use warm salt water gargles 3 times a day.    Patient verbalized understanding of and agreed to plan of care.    Return if symptoms worsen or fail to improve.     Danette Klein DNP, FNP-C  [facility]  O: [phone]      Please note that portions of this note were completed with a voice recognition program. Please call if questions.

## 2022-09-26 ENCOUNTER — OFFICE VISIT (OUTPATIENT)
Dept: INTERNAL MEDICINE | Facility: CLINIC | Age: 27
End: 2022-09-26

## 2022-09-26 VITALS
OXYGEN SATURATION: 99 % | SYSTOLIC BLOOD PRESSURE: 112 MMHG | HEART RATE: 69 BPM | HEIGHT: 66 IN | WEIGHT: 132 LBS | DIASTOLIC BLOOD PRESSURE: 72 MMHG | TEMPERATURE: 98.2 F | BODY MASS INDEX: 21.21 KG/M2

## 2022-09-26 DIAGNOSIS — Z23 NEEDS FLU SHOT: ICD-10-CM

## 2022-09-26 DIAGNOSIS — Z76.89 RETURN TO WORK EVALUATION: Primary | ICD-10-CM

## 2022-09-26 PROCEDURE — 90471 IMMUNIZATION ADMIN: CPT | Performed by: NURSE PRACTITIONER

## 2022-09-26 PROCEDURE — 99213 OFFICE O/P EST LOW 20 MIN: CPT | Performed by: NURSE PRACTITIONER

## 2022-09-26 PROCEDURE — 90686 IIV4 VACC NO PRSV 0.5 ML IM: CPT | Performed by: NURSE PRACTITIONER

## 2022-09-26 NOTE — PROGRESS NOTES
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  Patient needs a return to school note today.  Patient states that she is still breast-feeding and tends to get nauseous if she does not eat on a regular basis.  Patient states that she had gone several hours that day without eating and thinks that was the main reason behind the event.    Patient feels well today.  Would also like to update her flu vaccine while she is here.    Review of Systems:  Negative for fever, congestion, chest pain upon exertion, shortness of breath, vision changes, vomiting, dysuria, lymphadenopathy, muscle weakness, numbness, mood changes, rashes.    The following portions of the patient's history were reviewed and updated as appropriate: allergies, current medications, past family history, past medical history, past social history, past surgical history and problem list.    Patient Active Problem List   Diagnosis   • Thyroid nodule     Past Medical History:   Diagnosis Date   • Acute midline low back pain without sciatica 2019   • Muscle weakness 2019   •  (normal spontaneous vaginal delivery) 10/19/2019     Past Surgical History:   Procedure Laterality Date   • DILATATION AND CURETTAGE     • US GUIDED FINE NEEDLE ASPIRATION  5/15/2020     Family History   Problem Relation Age of Onset   • Heart disease Mother        Current Outpatient Medications:   •  acetaminophen (TYLENOL) 500 MG tablet, Take  by mouth., Disp: , Rfl:   •  azithromycin (Zithromax Z-[place]) 250 MG tablet, Take 2 tablets by mouth on day 1, then 1 tablet daily on days 2-5, Disp: 6 tablet, Rfl: 0  •  fluconazole (Diflucan) 200 MG tablet, Take 2 tabs on day #1, then 1 tab orally daily for 10 days, Disp: 12  "tablet, Rfl: 0  •  fluticasone (Flonase) 50 MCG/ACT nasal spray, 2 sprays into the nostril(s) as directed by provider Daily., Disp: 11.1 mL, Rfl: 3  •  Prenatal Vit-Fe Fumarate-FA (PRENATAL VITAMIN) 27-0.8 MG tablet, Take 1 tablet by mouth Daily., Disp: 30 tablet, Rfl:   •  pseudoephedrine (SUDAFED) 60 MG tablet, Take 1 tablet by mouth Every 4 (Four) Hours As Needed for Congestion., Disp: 30 tablet, Rfl: 0  No Known Allergies  Social History     Tobacco Use   • Smoking status: Never Smoker   • Smokeless tobacco: Never Used   Substance Use Topics   • Alcohol use: Yes     Comment: SC          Objective   Physical Exam   Vitals:    09/26/22 1409   BP: 112/72   Pulse: 69   Temp: 98.2 °F (36.8 °C)   TempSrc: Temporal   SpO2: 99%   Weight: 59.9 kg (132 lb)   Height: 167.6 cm (66\")     Body mass index is 21.31 kg/m².    Constitutional: NAD.  Eyes: EOMI. PERRLA. Normal conjunctiva.  Cardiovascular: RRR. No murmurs. No LE edema b/l. Radial pulses 2+ bilaterally.  Pulmonary: CTA b/l. Good effort.  Integumentary: No rashes or wounds on face or upper extremities.  Psychiatric: Normal affect. Normal thought content.           Assessment & Plan   Assessment and Plan  Pleasant 27-year-old female with thyroid nodule here today to discuss the following:    Diagnoses and all orders for this visit:    1. Return to work evaluation (Primary)    2. Needs flu shot  -     FluLaval/Fluzone >6 mos ([id number])         Discussed with patient about the importance of keeping blood sugar up.  Note was printed and given to patient before discharge.  Answered some questions from the patient.  Patient verbalized understanding of and agreed to plan of care.    Return if symptoms worsen or fail to improve.     Danette Klein DNP, FNP-C  Family Medicine  O: [phone]      Please note that portions of this note were completed with a voice recognition program. Please call if questions.     "

## 2022-10-27 ENCOUNTER — OFFICE VISIT (OUTPATIENT)
Dept: INTERNAL MEDICINE | Facility: CLINIC | Age: 27
End: 2022-10-27

## 2022-10-27 VITALS
HEIGHT: 66 IN | WEIGHT: 134 LBS | RESPIRATION RATE: 14 BRPM | BODY MASS INDEX: 21.53 KG/M2 | HEART RATE: 48 BPM | DIASTOLIC BLOOD PRESSURE: 72 MMHG | OXYGEN SATURATION: 100 % | TEMPERATURE: 96.8 F | SYSTOLIC BLOOD PRESSURE: 106 MMHG

## 2022-10-27 DIAGNOSIS — E89.0 HISTORY OF PARTIAL THYROIDECTOMY: ICD-10-CM

## 2022-10-27 DIAGNOSIS — Z00.00 ANNUAL PHYSICAL EXAM: Primary | ICD-10-CM

## 2022-10-27 PROBLEM — E04.1 THYROID NODULE: Status: RESOLVED | Noted: 2020-01-23 | Resolved: 2022-10-27

## 2022-10-27 PROCEDURE — 99395 PREV VISIT EST AGE 18-39: CPT | Performed by: FAMILY MEDICINE

## 2022-10-27 NOTE — PROGRESS NOTES
Date of Encounter: 10/27/2022  Patient: Pari Mcgrath,  1995    Subjective   History of Presenting Illness  Chief complaint: Annual physical    No acute concerns.    Patient is weaning breast-feeding and she has felt voraciously hungry most days.  She also gets shaky when she does not eat food for long periods of time.  She is currently breast-feeding twice daily and pumping, slowly weaning without any difficulty.     Thyroid nodule with benign biopsy status post partial thyroidectomy with normal TSH levels on repeat.      Lives at home with spouse and 2 children. never smoker, rare alcohol use. Not currently exercising. Well-balanced diet.  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  Up-to-date on vaccinations with the exception of COVID-19 which she declines    Review of Systems:  Negative for fever, congestion, chest pain upon exertion, shortness of breath, vision changes, vomiting, dysuria, lymphadenopathy, muscle weakness, numbness, mood changes, rashes.    The following portions of the patient's history were reviewed and updated as appropriate: allergies, current medications, past family history, past medical history, past social history, past surgical history and problem list.    Patient Active Problem List   Diagnosis   • Thyroid nodule     Past Medical History:   Diagnosis Date   • Acute midline low back pain without sciatica 2019   • Muscle weakness 2019   •  (normal spontaneous vaginal delivery) 10/19/2019     Past Surgical History:   Procedure Laterality Date   • DILATATION AND CURETTAGE     • US GUIDED FINE NEEDLE ASPIRATION  5/15/2020     Family History   Problem Relation Age of Onset   • Heart disease Mother        Current Outpatient Medications:   •  acetaminophen (TYLENOL) 500 MG tablet, Take  by mouth., Disp: , Rfl:   •  fluticasone (Flonase) 50 MCG/ACT nasal spray, 2 sprays into the nostril(s) as directed by  "provider Daily., Disp: 11.1 mL, Rfl: 3  No Known Allergies  Social History     Tobacco Use   • Smoking status: Never   • Smokeless tobacco: Never   Substance Use Topics   • Alcohol use: Yes     Comment: SC          Objective   Physical Exam  Vitals:    10/27/22 1533   BP: 106/72   BP Location: Left arm   Patient Position: Sitting   Cuff Size: Adult   Pulse: (!) 48   Resp: 14   Temp: 96.8 °F (36 °C)   SpO2: 100%   Weight: 60.8 kg (134 lb)   Height: 167.6 cm (66\")     Body mass index is 21.63 kg/m².    Constitutional: NAD.  Eyes: EOMI. PERRLA. Normal conjunctiva.  Ear, nose, mouth, throat: No tonsillar exudates or erythema.   Normal nasal mucosa. Normal external ear canals and TMs bilaterally.  Cardiovascular: RRR. No murmurs. No LE edema b/l. Radial pulses 2+ bilaterally.  Pulmonary: CTA b/l. Good effort.  Integumentary: No rashes or wounds on face or upper extremities.  Lymphatic: No anterior cervical lymphadenopathy.  Endocrine: No thyromegaly or palpable thyroid nodules.  Psychiatric: Normal affect. Normal thought content.  Gastrointestinal: Nondistended. No hepatosplenomegaly. No focal tenderness to palpation. Normal bowel sounds.     Assessment & Plan   Assessment and Plan  Pleasant 27-year-old female with history of partial thyroidectomy who presents for the following:    Diagnoses and all orders for this visit:    1. Annual physical exam (Primary): We discussed preventative care including age and patient-appropriate immunizations and cancer screening. We also discussed the importance of exercise and a healthy diet. This is their annual preventative exam.    2. History of partial thyroidectomy: Reviewed her recent blood work which does include a normal TSH.  Does not look like she has ever had lipids done she cannot recall ever having her lipids done to screen for familial hyperlipidemia.  Because she has had a lot of blood work over the past year I am going to hold on labs for now but at her follow-up " appointment in 1 year we will get a full panel including lipids for screening    Return to office in 1 year for annual physical or earlier as needed.    Chaim Harrington MD  Family Medicine  O: [phone]    Disclaimer: Parts of this note were dictated by speech recognition. Minor errors in transcription may be present. Please call if questions.

## 2023-08-01 ENCOUNTER — PRIOR AUTHORIZATION (OUTPATIENT)
Dept: INTERNAL MEDICINE | Facility: CLINIC | Age: 28
End: 2023-08-01
Payer: COMMERCIAL